# Patient Record
Sex: FEMALE | Race: WHITE | Employment: PART TIME | URBAN - METROPOLITAN AREA
[De-identification: names, ages, dates, MRNs, and addresses within clinical notes are randomized per-mention and may not be internally consistent; named-entity substitution may affect disease eponyms.]

---

## 2017-06-22 ENCOUNTER — OFFICE VISIT (OUTPATIENT)
Dept: URGENT CARE | Facility: URGENT CARE | Age: 52
End: 2017-06-22
Payer: MEDICAID

## 2017-06-22 VITALS
HEART RATE: 76 BPM | DIASTOLIC BLOOD PRESSURE: 65 MMHG | TEMPERATURE: 98.2 F | OXYGEN SATURATION: 96 % | SYSTOLIC BLOOD PRESSURE: 96 MMHG

## 2017-06-22 DIAGNOSIS — J01.00 ACUTE NON-RECURRENT MAXILLARY SINUSITIS: Primary | ICD-10-CM

## 2017-06-22 PROCEDURE — 99213 OFFICE O/P EST LOW 20 MIN: CPT | Performed by: HOSPITALIST

## 2017-06-22 NOTE — MR AVS SNAPSHOT
After Visit Summary   6/22/2017    Jemima Panda    MRN: [id number]           Patient Information     Date Of Birth          1965        Visit Information        Provider Department      6/22/2017 6:35 PM Vance Castañeda MD [facility]        Today's Diagnoses     Acute non-recurrent maxillary sinusitis    -  1      Care Instructions      Sinusitis (Antibiotic Treatment)    The sinuses are air-filled spaces within the bones of the face. They connect to the inside of the nose. Sinusitis is an inflammation of the tissue lining the sinus cavity. Sinus inflammation can occur during a cold. It can also be due to allergies to pollens and other particles in the air. Sinusitis can cause symptoms of sinus congestion and fullness. A sinus infection causes fever, headache and facial pain. There is often green or yellow drainage from the nose or into the back of the throat (post-nasal drip). You have been given antibiotics to treat this condition.  Home care:    Take the full course of antibiotics as instructed. Do not stop taking them, even if you feel better.    Drink plenty of water, hot tea, and other liquids. This may help thin mucus. It also may promote sinus drainage.    Heat may help soothe painful areas of the face. Use a towel soaked in hot water. Or,  the shower and direct the hot spray onto your face. Using a vaporizer along with a menthol rub at night may also help.     An expectorant containing guaifenesin may help thin the mucus and promote drainage from the sinuses.    Over-the-counter decongestants may be used unless a similar medicine was prescribed. Nasal sprays work the fastest. Use one that contains phenylephrine or oxymetazoline. First blow the nose gently. Then use the spray. Do not use these medicines more often than directed on the label or symptoms may get worse. You may also use tablets containing pseudoephedrine. Avoid products that combine  ingredients, because side effects may be increased. Read labels. You can also ask the pharmacist for help. (NOTE: Persons with high blood pressure should not use decongestants. They can raise blood pressure.)    Over-the-counter antihistamines may help if allergies contributed to your sinusitis.      Do not use nasal rinses or irrigation during an acute sinus infection, unless told to by your health care provider. Rinsing may spread the infection to other sinuses.    Use acetaminophen or ibuprofen to control pain, unless another pain medicine was prescribed. (If you have chronic liver or kidney disease or ever had a stomach ulcer, talk with your doctor before using these medicines. Aspirin should never be used in anyone under 18 years of age who is ill with a fever. It may cause severe liver damage.)    Don't smoke. This can worsen symptoms.  Follow-up care  Follow up with your healthcare provider or our staff if you are not improving within the next week.  When to seek medical advice  Call your healthcare provider if any of these occur:    Facial pain or headache becoming more severe    Stiff neck    Unusual drowsiness or confusion    Swelling of the forehead or eyelids    Vision problems, including blurred or double vision    Fever of 100.4 F (38 C) or higher, or as directed by your healthcare provider    Seizure    Breathing problems    Symptoms not resolving within 10 days  Date Last Reviewed: 4/13/2015 2000-2017 [employer]. [address] [place] [address], [place] [address]. All rights reserved. This information is not intended as a substitute for professional medical care. Always follow your healthcare professional's instructions.                Follow-ups after your visit        Who to contact     If you have questions or need follow up information about today's clinic visit or your schedule please contact [facility] directly at [phone].  Normal or non-critical lab and  "imaging results will be communicated to you by MyChart, letter or phone within 4 business days after the clinic has received the results. If you do not hear from us within 7 days, please contact the clinic through [employer] or phone. If you have a critical or abnormal lab result, we will notify you by phone as soon as possible.  Submit refill requests through [employer] or call your pharmacy and they will forward the refill request to us. Please allow 3 business days for your refill to be completed.          Additional Information About Your Visit        [employer] Information     [employer] lets you send messages to your doctor, view your test results, renew your prescriptions, schedule appointments and more. To sign up, go to [email or web address] . Click on \"Log in\" on the left side of the screen, which will take you to the Welcome page. Then click on \"Sign up Now\" on the right side of the page.     You will be asked to enter the access code listed below, as well as some personal information. Please follow the directions to create your username and password.     Your access code is: [email or web address]  Expires: 2017  7:03 PM     Your access code will  in 90 days. If you need help or a new code, please call your [facility] clinic or [phone].        Care EveryWhere ID     This is your Care EveryWhere ID. This could be used by other organizations to access your [facility] medical records  [id number]        Your Vitals Were     Pulse Temperature Pulse Oximetry Breastfeeding?          76 98.2  F (36.8  C) (Oral) 96% No         Blood Pressure from Last 3 Encounters:   17 96/65   09/16/15 110/77   08/30/15 115/75    Weight from Last 3 Encounters:   09/16/15 115 lb (52.2 kg)   08/30/15 115 lb (52.2 kg)   01/29/15 115 lb (52.2 kg)              Today, you had the following     No orders found for display         Today's Medication Changes          These changes are accurate as of: 17  7:04 PM.  If you have any " questions, ask your nurse or doctor.               Start taking these medicines.        Dose/Directions    amoxicillin-clavulanate 875-125 MG per tablet   Commonly known as:  AUGMENTIN   Used for:  Acute non-recurrent maxillary sinusitis   Started by:  Vance Castañeda MD        Dose:  1 tablet   Take 1 tablet by mouth 2 times daily   Quantity:  28 tablet   Refills:  0            Where to get your medicines      Some of these will need a paper prescription and others can be bought over the counter.  Ask your nurse if you have questions.     Bring a paper prescription for each of these medications     amoxicillin-clavulanate 875-125 MG per tablet                Primary Care Provider    Physician No Ref-Primary       No address on file        Equal Access to Services     [facility]: Hadrajendra Mallory, walashell woodruff, dilshad laurentalmayara tenorio, reno patel . So [facility] [phone].    ATENCIÓN: Si habla español, tiene a byers disposición servicios gratuitos de asistencia lingüística. Llame al [phone].    We comply with applicable federal civil rights laws and [place] laws. We do not discriminate on the basis of race, color, national origin, age, disability sex, sexual orientation or gender identity.            Thank you!     Thank you for choosing [facility]  for your care. Our goal is always to provide you with excellent care. Hearing back from our patients is one way we can continue to improve our services. Please take a few minutes to complete the written survey that you may receive in the mail after your visit with us. Thank you!             Your Updated Medication List - Protect others around you: Learn how to safely use, store and throw away your medicines at www.disposemymeds.org.          This list is accurate as of: 6/22/17  7:04 PM.  Always use your most recent med list.                   Brand Name Dispense Instructions for use  Diagnosis    amoxicillin-clavulanate 875-125 MG per tablet    AUGMENTIN    28 tablet    Take 1 tablet by mouth 2 times daily    Acute non-recurrent maxillary sinusitis       IBUPROFEN PO           oxyCODONE 5 MG IR tablet    ROXICODONE    20 tablet    Take 1 tablet (5 mg) by mouth every 6 hours as needed for pain        oxyCODONE-acetaminophen 5-325 MG per tablet    PERCOCET    15 tablet    Take 1-2 tablets by mouth every 4 hours as needed for pain        TYLENOL PO

## 2017-06-22 NOTE — NURSING NOTE
"Chief Complaint   Patient presents with     Urgent Care     URI     Cold started two months ago--patient is feeling better but she still feels more fatigue then usual at night along with a fever. But patient is more concern with her taste and smell, which is what brought her in this evening--she states that she lost her sense of taste and smell and it never came back after the cold.        Initial BP 96/65 (BP Location: Left arm, Cuff Size: Adult Regular)  Pulse 76  Temp 98.2  F (36.8  C) (Oral)  SpO2 96%  Breastfeeding? No Estimated body mass index is 20.37 kg/(m^2) as calculated from the following:    Height as of 9/16/15: 5' 3\" (1.6 m).    Weight as of 9/16/15: 115 lb (52.2 kg).  Medication Reconciliation: complete.  STELLA Whitfield      "

## 2017-06-22 NOTE — PROGRESS NOTES
Pt came here for sinus pressure, headache and cough. Has been going on for about 1 month or 5 weeks, she has not seek any medical attention since pt has no insurance, she denies any fever or chill. Denies any chest pain or shortness of breath.     No Known Allergies    Past Medical History:   Diagnosis Date     Anxiety      Chronic kidney disease     kidney stone         Current Outpatient Prescriptions on File Prior to Visit:  oxyCODONE (ROXICODONE) 5 MG immediate release tablet Take 1 tablet (5 mg) by mouth every 6 hours as needed for pain (Patient not taking: Reported on 6/22/2017)   IBUPROFEN PO    Acetaminophen (TYLENOL PO)    oxyCODONE-acetaminophen (PERCOCET) 5-325 MG per tablet Take 1-2 tablets by mouth every 4 hours as needed for pain (Patient not taking: Reported on 6/22/2017)     No current facility-administered medications on file prior to visit.     Social History   Substance Use Topics     Smoking status: Never Smoker     Smokeless tobacco: Not on file     Alcohol use No       ROS:  Consitutional: As above  ENT: As above  Respiratory: As above    OBJECTIVE:  BP 96/65 (BP Location: Left arm, Cuff Size: Adult Regular)  Pulse 76  Temp 98.2  F (36.8  C) (Oral)  SpO2 96%  Breastfeeding? No  GENERAL APPEARANCE: healthy, alert and moderate distress  EYES: conjunctiva clear  EARS:no cerumen.   Ear canals w/o erythema, TM's intact w/o erythema, bulging noted bilaterally    NOSE/MOUTH: Nose and mouth without ulcers, erythema or lesions, tenderness noted on maxillary and frontal sinus palpation  THROAT: no erythema w/ no tonsillar enlargement . Yellow color post nasal drip noted  NECK: supple, nontender, no lymphadenopathy  RESP: lungs clear to auscultation - no rales, rhonchi or wheezes  CV: regular rates and rhythm, normal S1 S2, no murmur noted  NEURO: awake, alert        No results found for this or any previous visit (from the past 168 hour(s)).     ASSESSMENT:     ICD-10-CM    1. Acute non-recurrent  maxillary sinusitis J01.00          PLAN:    Will start augmentin bid for 14 days, tylenol and ibuprofen prn, advice to try sinus rinse, try to take mucinex too.   Lots of rest and fluids.  RTC if any worsening symptoms or if not improving.    Vance Castañeda MD

## 2017-06-23 NOTE — PATIENT INSTRUCTIONS
Sinusitis (Antibiotic Treatment)    The sinuses are air-filled spaces within the bones of the face. They connect to the inside of the nose. Sinusitis is an inflammation of the tissue lining the sinus cavity. Sinus inflammation can occur during a cold. It can also be due to allergies to pollens and other particles in the air. Sinusitis can cause symptoms of sinus congestion and fullness. A sinus infection causes fever, headache and facial pain. There is often green or yellow drainage from the nose or into the back of the throat (post-nasal drip). You have been given antibiotics to treat this condition.  Home care:    Take the full course of antibiotics as instructed. Do not stop taking them, even if you feel better.    Drink plenty of water, hot tea, and other liquids. This may help thin mucus. It also may promote sinus drainage.    Heat may help soothe painful areas of the face. Use a towel soaked in hot water. Or,  the shower and direct the hot spray onto your face. Using a vaporizer along with a menthol rub at night may also help.     An expectorant containing guaifenesin may help thin the mucus and promote drainage from the sinuses.    Over-the-counter decongestants may be used unless a similar medicine was prescribed. Nasal sprays work the fastest. Use one that contains phenylephrine or oxymetazoline. First blow the nose gently. Then use the spray. Do not use these medicines more often than directed on the label or symptoms may get worse. You may also use tablets containing pseudoephedrine. Avoid products that combine ingredients, because side effects may be increased. Read labels. You can also ask the pharmacist for help. (NOTE: Persons with high blood pressure should not use decongestants. They can raise blood pressure.)    Over-the-counter antihistamines may help if allergies contributed to your sinusitis.      Do not use nasal rinses or irrigation during an acute sinus infection, unless told to by  your health care provider. Rinsing may spread the infection to other sinuses.    Use acetaminophen or ibuprofen to control pain, unless another pain medicine was prescribed. (If you have chronic liver or kidney disease or ever had a stomach ulcer, talk with your doctor before using these medicines. Aspirin should never be used in anyone under 18 years of age who is ill with a fever. It may cause severe liver damage.)    Don't smoke. This can worsen symptoms.  Follow-up care  Follow up with your healthcare provider or our staff if you are not improving within the next week.  When to seek medical advice  Call your healthcare provider if any of these occur:    Facial pain or headache becoming more severe    Stiff neck    Unusual drowsiness or confusion    Swelling of the forehead or eyelids    Vision problems, including blurred or double vision    Fever of 100.4 F (38 C) or higher, or as directed by your healthcare provider    Seizure    Breathing problems    Symptoms not resolving within 10 days  Date Last Reviewed: 4/13/2015 2000-2017 [employer]. [address] [place] [address], [place] [id number]. All rights reserved. This information is not intended as a substitute for professional medical care. Always follow your healthcare professional's instructions.

## 2017-07-07 ENCOUNTER — RADIANT APPOINTMENT (OUTPATIENT)
Dept: GENERAL RADIOLOGY | Facility: CLINIC | Age: 52
End: 2017-07-07
Attending: FAMILY MEDICINE

## 2017-07-07 ENCOUNTER — OFFICE VISIT (OUTPATIENT)
Dept: URGENT CARE | Facility: URGENT CARE | Age: 52
End: 2017-07-07

## 2017-07-07 VITALS
TEMPERATURE: 97.7 F | DIASTOLIC BLOOD PRESSURE: 59 MMHG | HEART RATE: 72 BPM | OXYGEN SATURATION: 96 % | SYSTOLIC BLOOD PRESSURE: 97 MMHG

## 2017-07-07 DIAGNOSIS — J06.9 VIRAL URI WITH COUGH: Primary | ICD-10-CM

## 2017-07-07 DIAGNOSIS — J30.2 ACUTE SEASONAL ALLERGIC RHINITIS, UNSPECIFIED TRIGGER: ICD-10-CM

## 2017-07-07 LAB
BASOPHILS # BLD AUTO: 0 10E9/L (ref 0–0.2)
BASOPHILS NFR BLD AUTO: 0.2 %
DIFFERENTIAL METHOD BLD: ABNORMAL
EOSINOPHIL # BLD AUTO: 0.2 10E9/L (ref 0–0.7)
EOSINOPHIL NFR BLD AUTO: 2.9 %
ERYTHROCYTE [DISTWIDTH] IN BLOOD BY AUTOMATED COUNT: 13.4 % (ref 10–15)
HCT VFR BLD AUTO: 36 % (ref 35–47)
HGB BLD-MCNC: 11.9 G/DL (ref 11.7–15.7)
LYMPHOCYTES # BLD AUTO: 2 10E9/L (ref 0.8–5.3)
LYMPHOCYTES NFR BLD AUTO: 38.9 %
MCH RBC QN AUTO: 32 PG (ref 26.5–33)
MCHC RBC AUTO-ENTMCNC: 33.1 G/DL (ref 31.5–36.5)
MCV RBC AUTO: 97 FL (ref 78–100)
MONOCYTES # BLD AUTO: 0.4 10E9/L (ref 0–1.3)
MONOCYTES NFR BLD AUTO: 7.5 %
NEUTROPHILS # BLD AUTO: 2.6 10E9/L (ref 1.6–8.3)
NEUTROPHILS NFR BLD AUTO: 50.5 %
PLATELET # BLD AUTO: 264 10E9/L (ref 150–450)
RBC # BLD AUTO: 3.72 10E12/L (ref 3.8–5.2)
WBC # BLD AUTO: 5.2 10E9/L (ref 4–11)

## 2017-07-07 PROCEDURE — 71020 XR CHEST 2 VW: CPT

## 2017-07-07 PROCEDURE — 85025 COMPLETE CBC W/AUTO DIFF WBC: CPT | Performed by: FAMILY MEDICINE

## 2017-07-07 PROCEDURE — 36415 COLL VENOUS BLD VENIPUNCTURE: CPT | Performed by: FAMILY MEDICINE

## 2017-07-07 PROCEDURE — 99214 OFFICE O/P EST MOD 30 MIN: CPT | Performed by: FAMILY MEDICINE

## 2017-07-07 RX ORDER — MONTELUKAST SODIUM 10 MG/1
10 TABLET ORAL AT BEDTIME
Qty: 30 TABLET | Refills: 1 | Status: SHIPPED | OUTPATIENT
Start: 2017-07-07

## 2017-07-07 RX ORDER — GUAIFENESIN/DEXTROMETHORPHAN 100-10MG/5
5 SYRUP ORAL EVERY 4 HOURS PRN
Qty: 560 ML | Refills: 0 | Status: SHIPPED | OUTPATIENT
Start: 2017-07-07

## 2017-07-07 RX ORDER — PSEUDOEPHEDRINE HCL 120 MG/1
120 TABLET, FILM COATED, EXTENDED RELEASE ORAL EVERY 12 HOURS PRN
Qty: 14 TABLET | Refills: 0 | Status: SHIPPED | OUTPATIENT
Start: 2017-07-07 | End: 2017-07-14

## 2017-07-07 RX ORDER — FLUTICASONE PROPIONATE 50 MCG
1-2 SPRAY, SUSPENSION (ML) NASAL DAILY
Qty: 3 BOTTLE | Refills: 1 | Status: SHIPPED | OUTPATIENT
Start: 2017-07-07

## 2017-07-07 NOTE — PROGRESS NOTES
SUBJECTIVE:  Jemima Panda is a 51 year old female here with concerns about sinus infection.  She states onset of symptoms were 1 month(s) ago.  She has had maxillary pressure. Course of illness is worsening. Severity moderate. She was seen on 6/22 for acute sinusitis and was given 14 days of Augmentin, but her symptoms are not improving.   Current and Associated symptoms: fever, nasal congestion, rhinorrhea and facial pain/pressure  Predisposing factors include recent illness. Recent treatment has included: 14 days of Augmentin.     Past Medical History:   Diagnosis Date     Anxiety      Chronic kidney disease     kidney stone     Social History   Substance Use Topics     Smoking status: Never Smoker     Smokeless tobacco: Not on file     Alcohol use No       ROS:  CONSTITUTIONAL:POSITIVE  for fatigue, fever  and malaise  ENT/MOUTH: POSITIVE for earache bilateral, nasal congestion, rhinorrhea-clear and sinus pressure  RESP:NEGATIVE for significant cough or SOB  CV: NEGATIVE for chest pain, palpitations or peripheral edema    OBJECTIVE:  BP 97/59 (BP Location: Left arm, Cuff Size: Adult Regular)  Pulse 72  Temp 97.7  F (36.5  C) (Oral)  SpO2 96%  Breastfeeding? No  Exam:GENERAL APPEARANCE: healthy, alert and no distress  EYES: EOMI,  PERRL, conjunctiva clear  HENT: ear canals and TM's normal.  Nose and mouth without ulcers, erythema or lesions  NECK: supple, nontender, no lymphadenopathy  RESP: lungs clear to auscultation - no rales, rhonchi or wheezes  CV: regular rates and rhythm, normal S1 S2, no murmur noted    ASSESSMENT:  1. Viral URI with cough: most likley viral infection. CXR negative for infection. No leukocytosis.   PLAN:  -Patient education and reassurance provided.  -Discussed etiology and expected course.   -Supportive care.  Encourage fluids, fruits and vegetables.  -Symptomatic treat with gargles, lozenges, and OTC analgesic as needed.   -Follow-up with primary clinic if not improving.  - CBC  with platelets and differential  - XR Chest 2 Views  - pseudoePHEDrine (SUDAFED) 120 MG 12 hr tablet; Take 1 tablet (120 mg) by mouth every 12 hours as needed for congestion  Dispense: 14 tablet; Refill: 0  - guaiFENesin-dextromethorphan (ROBITUSSIN DM) 100-10 MG/5ML syrup; Take 5 mLs by mouth every 4 hours as needed for cough  Dispense: 560 mL; Refill: 0    2. Acute seasonal allergic rhinitis, unspecified trigger  - montelukast (SINGULAIR) 10 MG tablet; Take 1 tablet (10 mg) by mouth At Bedtime  Dispense: 30 tablet; Refill: 1  - fluticasone (FLONASE) 50 MCG/ACT spray; Spray 1-2 sprays into both nostrils daily  Dispense: 3 Bottle; Refill: 1      Akilah Servin MD  [facility]

## 2017-07-07 NOTE — NURSING NOTE
"Chief Complaint   Patient presents with     Urgent Care     Sinus Problem     Sinus infection for two months and now patient is having facial pain. Patient has been seen here and treated back in June.        Initial BP 97/59 (BP Location: Left arm, Cuff Size: Adult Regular)  Pulse 72  Temp 97.7  F (36.5  C) (Oral)  SpO2 96%  Breastfeeding? No Estimated body mass index is 20.37 kg/(m^2) as calculated from the following:    Height as of 9/16/15: 5' 3\" (1.6 m).    Weight as of 9/16/15: 115 lb (52.2 kg).  Medication Reconciliation: complete.  STELLA Whitfield      "

## 2017-07-07 NOTE — MR AVS SNAPSHOT
After Visit Summary   7/7/2017    Jemima Panad    MRN: [id number]           Patient Information     Date Of Birth          1965        Visit Information        Provider Department      7/7/2017 5:15 PM Akilah Servin MD [facility]        Today's Diagnoses     Viral URI with cough    -  1    Acute seasonal allergic rhinitis, unspecified trigger          Care Instructions    1. Viral URI with cough: most likley viral infection. CXR negative for infection. No leukocytosis.   PLAN:  -Patient education and reassurance provided.  -Discussed etiology and expected course.   -Supportive care.  Encourage fluids, fruits and vegetables.  -Symptomatic treat with gargles, lozenges, and OTC analgesic as needed.   -Follow-up with primary clinic if not improving.  - CBC with platelets and differential  - XR Chest 2 Views  - pseudoePHEDrine (SUDAFED) 120 MG 12 hr tablet; Take 1 tablet (120 mg) by mouth every 12 hours as needed for congestion  Dispense: 14 tablet; Refill: 0  - guaiFENesin-dextromethorphan (ROBITUSSIN DM) 100-10 MG/5ML syrup; Take 5 mLs by mouth every 4 hours as needed for cough  Dispense: 560 mL; Refill: 0    2. Acute seasonal allergic rhinitis, unspecified trigger  - montelukast (SINGULAIR) 10 MG tablet; Take 1 tablet (10 mg) by mouth At Bedtime  Dispense: 30 tablet; Refill: 1  - fluticasone (FLONASE) 50 MCG/ACT spray; Spray 1-2 sprays into both nostrils daily  Dispense: 3 Bottle; Refill: 1          Follow-ups after your visit        Who to contact     If you have questions or need follow up information about today's clinic visit or your schedule please contact [facility] directly at [phone].  Normal or non-critical lab and imaging results will be communicated to you by MyChart, letter or phone within 4 business days after the clinic has received the results. If you do not hear from us within 7 days, please contact the clinic through  "viavoohart or phone. If you have a critical or abnormal lab result, we will notify you by phone as soon as possible.  Submit refill requests through [employer] or call your pharmacy and they will forward the refill request to us. Please allow 3 business days for your refill to be completed.          Additional Information About Your Visit        viavooharCrysalin Information     Chalkable lets you send messages to your doctor, view your test results, renew your prescriptions, schedule appointments and more. To sign up, go to [email or web address] . Click on \"Log in\" on the left side of the screen, which will take you to the Welcome page. Then click on \"Sign up Now\" on the right side of the page.     You will be asked to enter the access code listed below, as well as some personal information. Please follow the directions to create your username and password.     Your access code is: [email or web address]  Expires: 2017  7:03 PM     Your access code will  in 90 days. If you need help or a new code, please call your [place] clinic or [phone].        Care EveryWhere ID     This is your Care EveryWhere ID. This could be used by other organizations to access your [place] medical records  [id number]        Your Vitals Were     Pulse Temperature Pulse Oximetry Breastfeeding?          72 97.7  F (36.5  C) (Oral) 96% No         Blood Pressure from Last 3 Encounters:   17 97/59   17 96/65   09/16/15 110/77    Weight from Last 3 Encounters:   09/16/15 115 lb (52.2 kg)   08/30/15 115 lb (52.2 kg)   01/29/15 115 lb (52.2 kg)              We Performed the Following     CBC with platelets and differential     XR Chest 2 Views          Today's Medication Changes          These changes are accurate as of: 17  6:15 PM.  If you have any questions, ask your nurse or doctor.               Start taking these medicines.        Dose/Directions    fluticasone 50 MCG/ACT spray   Commonly known as:  FLONASE   Used for:  Acute " seasonal allergic rhinitis, unspecified trigger   Started by:  Akilah Servin MD        Dose:  1-2 spray   Spray 1-2 sprays into both nostrils daily   Quantity:  3 Bottle   Refills:  1       guaiFENesin-dextromethorphan 100-10 MG/5ML syrup   Commonly known as:  ROBITUSSIN DM   Used for:  Viral URI with cough   Started by:  Akilah Servin MD        Dose:  5 mL   Take 5 mLs by mouth every 4 hours as needed for cough   Quantity:  560 mL   Refills:  0       montelukast 10 MG tablet   Commonly known as:  SINGULAIR   Used for:  Acute seasonal allergic rhinitis, unspecified trigger   Started by:  Akilah Servin MD        Dose:  10 mg   Take 1 tablet (10 mg) by mouth At Bedtime   Quantity:  30 tablet   Refills:  1       pseudoePHEDrine 120 MG 12 hr tablet   Commonly known as:  SUDAFED   Used for:  Viral URI with cough   Started by:  Akilah Servin MD        Dose:  120 mg   Take 1 tablet (120 mg) by mouth every 12 hours as needed for congestion   Quantity:  14 tablet   Refills:  0            Where to get your medicines      These [facility] [address] [place] [address] [address] AT [address] [place] [address] & [facility]  [address] [place] [id number]    Hours:  24-hours Phone:  [phone]     fluticasone 50 MCG/ACT spray    guaiFENesin-dextromethorphan 100-10 MG/5ML syrup    montelukast 10 MG tablet         Some of these will need a paper prescription and others can be bought over the counter.  Ask your nurse if you have questions.     Bring a paper prescription for each of these medications     pseudoePHEDrine 120 MG 12 hr tablet                Primary Care Provider    Physician No Ref-Primary       No address on file        Equal Access to Services     [facility] : Holly Mallory, walashell lugrey, qaybta kaalmada jona, reno elliott. So [facility] [phone].    ATENCIÓN: Si habla español, tiene a byers disposición servicios gratuitos de  asistencia lingüística. John al [phone].    We comply with applicable federal civil rights laws and [place] laws. We do not discriminate on the basis of race, color, national origin, age, disability sex, sexual orientation or gender identity.            Thank you!     Thank you for choosing [facility]  for your care. Our goal is always to provide you with excellent care. Hearing back from our patients is one way we can continue to improve our services. Please take a few minutes to complete the written survey that you may receive in the mail after your visit with us. Thank you!             Your Updated Medication List - Protect others around you: Learn how to safely use, store and throw away your medicines at www.disposemymeds.org.          This list is accurate as of: 7/7/17  6:15 PM.  Always use your most recent med list.                   Brand Name Dispense Instructions for use Diagnosis    amoxicillin-clavulanate 875-125 MG per tablet    AUGMENTIN    28 tablet    Take 1 tablet by mouth 2 times daily    Acute non-recurrent maxillary sinusitis       fluticasone 50 MCG/ACT spray    FLONASE    3 Bottle    Spray 1-2 sprays into both nostrils daily    Acute seasonal allergic rhinitis, unspecified trigger       guaiFENesin-dextromethorphan 100-10 MG/5ML syrup    ROBITUSSIN DM    560 mL    Take 5 mLs by mouth every 4 hours as needed for cough    Viral URI with cough       IBUPROFEN PO           montelukast 10 MG tablet    SINGULAIR    30 tablet    Take 1 tablet (10 mg) by mouth At Bedtime    Acute seasonal allergic rhinitis, unspecified trigger       oxyCODONE 5 MG IR tablet    ROXICODONE    20 tablet    Take 1 tablet (5 mg) by mouth every 6 hours as needed for pain        oxyCODONE-acetaminophen 5-325 MG per tablet    PERCOCET    15 tablet    Take 1-2 tablets by mouth every 4 hours as needed for pain        pseudoePHEDrine 120 MG 12 hr tablet    SUDAFED    14 tablet    Take 1 tablet (120  mg) by mouth every 12 hours as needed for congestion    Viral URI with cough       TYLENOL PO